# Patient Record
Sex: MALE | Race: WHITE | NOT HISPANIC OR LATINO | Employment: FULL TIME | URBAN - METROPOLITAN AREA
[De-identification: names, ages, dates, MRNs, and addresses within clinical notes are randomized per-mention and may not be internally consistent; named-entity substitution may affect disease eponyms.]

---

## 2021-01-28 ENCOUNTER — HISTORICAL (OUTPATIENT)
Dept: ADMINISTRATIVE | Facility: HOSPITAL | Age: 31
End: 2021-01-28

## 2021-01-28 LAB
ALBUMIN SERPL-MCNC: 4.8 G/DL (ref 4–5)
ALBUMIN/GLOB SERPL: 2 {RATIO} (ref 1.2–2.2)
ALP SERPL-CCNC: 57 IU/L (ref 39–117)
ALT SERPL-CCNC: 58 IU/L (ref 0–44)
AST SERPL-CCNC: 30 IU/L (ref 0–40)
BILIRUB SERPL-MCNC: 0.5 MG/DL (ref 0–1.2)
BUN SERPL-MCNC: 11 MG/DL (ref 6–20)
CALCIUM SERPL-MCNC: 10.1 MG/DL (ref 8.7–10.2)
CHLORIDE SERPL-SCNC: 104 MMOL/L (ref 96–106)
CO2 SERPL-SCNC: 23 MMOL/L (ref 20–29)
CREAT SERPL-MCNC: 1.24 MG/DL (ref 0.76–1.27)
CREAT/UREA NIT SERPL: 9 (ref 9–20)
GLOBULIN SER-MCNC: 2.4 G/DL (ref 1.5–4.5)
GLUCOSE SERPL-MCNC: 93 MG/DL (ref 65–99)
POTASSIUM SERPL-SCNC: 4.8 MMOL/L (ref 3.5–5.2)
PROT SERPL-MCNC: 7.2 G/DL (ref 6–8.5)
SODIUM SERPL-SCNC: 141 MMOL/L (ref 134–144)

## 2022-04-10 ENCOUNTER — HISTORICAL (OUTPATIENT)
Dept: ADMINISTRATIVE | Facility: HOSPITAL | Age: 32
End: 2022-04-10

## 2022-04-24 VITALS
SYSTOLIC BLOOD PRESSURE: 122 MMHG | DIASTOLIC BLOOD PRESSURE: 82 MMHG | WEIGHT: 261.44 LBS | BODY MASS INDEX: 38.72 KG/M2 | HEIGHT: 69 IN | OXYGEN SATURATION: 99 %

## 2022-08-16 ENCOUNTER — HISTORICAL (OUTPATIENT)
Dept: ADMINISTRATIVE | Facility: HOSPITAL | Age: 32
End: 2022-08-16

## 2023-05-31 ENCOUNTER — OFFICE VISIT (OUTPATIENT)
Dept: FAMILY MEDICINE | Facility: CLINIC | Age: 33
End: 2023-05-31
Payer: COMMERCIAL

## 2023-05-31 VITALS
WEIGHT: 274.19 LBS | BODY MASS INDEX: 39.25 KG/M2 | SYSTOLIC BLOOD PRESSURE: 104 MMHG | TEMPERATURE: 97 F | OXYGEN SATURATION: 96 % | HEIGHT: 70 IN | DIASTOLIC BLOOD PRESSURE: 70 MMHG | HEART RATE: 56 BPM

## 2023-05-31 DIAGNOSIS — K70.10 ALCOHOLIC HEPATITIS WITHOUT ASCITES: Primary | ICD-10-CM

## 2023-05-31 DIAGNOSIS — E78.2 MIXED HYPERLIPIDEMIA: ICD-10-CM

## 2023-05-31 DIAGNOSIS — F33.42 RECURRENT MAJOR DEPRESSIVE DISORDER, IN FULL REMISSION: ICD-10-CM

## 2023-05-31 DIAGNOSIS — G47.33 OBSTRUCTIVE SLEEP APNEA SYNDROME: ICD-10-CM

## 2023-05-31 DIAGNOSIS — R73.01 IMPAIRED FASTING GLUCOSE: ICD-10-CM

## 2023-05-31 DIAGNOSIS — K21.9 GASTROESOPHAGEAL REFLUX DISEASE WITHOUT ESOPHAGITIS: ICD-10-CM

## 2023-05-31 DIAGNOSIS — F41.1 GENERALIZED ANXIETY DISORDER: ICD-10-CM

## 2023-05-31 PROBLEM — F32.9 MAJOR DEPRESSIVE DISORDER: Status: ACTIVE | Noted: 2023-05-31

## 2023-05-31 PROBLEM — E66.9 OBESITY: Status: ACTIVE | Noted: 2023-05-31

## 2023-05-31 PROCEDURE — 3008F PR BODY MASS INDEX (BMI) DOCUMENTED: ICD-10-PCS | Mod: CPTII,,, | Performed by: FAMILY MEDICINE

## 2023-05-31 PROCEDURE — 3074F SYST BP LT 130 MM HG: CPT | Mod: CPTII,,, | Performed by: FAMILY MEDICINE

## 2023-05-31 PROCEDURE — 1159F MED LIST DOCD IN RCRD: CPT | Mod: CPTII,,, | Performed by: FAMILY MEDICINE

## 2023-05-31 PROCEDURE — 3074F PR MOST RECENT SYSTOLIC BLOOD PRESSURE < 130 MM HG: ICD-10-PCS | Mod: CPTII,,, | Performed by: FAMILY MEDICINE

## 2023-05-31 PROCEDURE — 99214 OFFICE O/P EST MOD 30 MIN: CPT | Mod: ,,, | Performed by: FAMILY MEDICINE

## 2023-05-31 PROCEDURE — 99214 PR OFFICE/OUTPT VISIT, EST, LEVL IV, 30-39 MIN: ICD-10-PCS | Mod: ,,, | Performed by: FAMILY MEDICINE

## 2023-05-31 PROCEDURE — 3078F DIAST BP <80 MM HG: CPT | Mod: CPTII,,, | Performed by: FAMILY MEDICINE

## 2023-05-31 PROCEDURE — 3078F PR MOST RECENT DIASTOLIC BLOOD PRESSURE < 80 MM HG: ICD-10-PCS | Mod: CPTII,,, | Performed by: FAMILY MEDICINE

## 2023-05-31 PROCEDURE — 3008F BODY MASS INDEX DOCD: CPT | Mod: CPTII,,, | Performed by: FAMILY MEDICINE

## 2023-05-31 PROCEDURE — 1159F PR MEDICATION LIST DOCUMENTED IN MEDICAL RECORD: ICD-10-PCS | Mod: CPTII,,, | Performed by: FAMILY MEDICINE

## 2023-05-31 RX ORDER — PROPRANOLOL HYDROCHLORIDE 120 MG/1
120 CAPSULE, EXTENDED RELEASE ORAL DAILY
Qty: 90 CAPSULE | Refills: 3 | Status: SHIPPED | OUTPATIENT
Start: 2023-05-31

## 2023-05-31 RX ORDER — PROPRANOLOL HYDROCHLORIDE 120 MG/1
120 CAPSULE, EXTENDED RELEASE ORAL DAILY
COMMUNITY
Start: 2023-03-03 | End: 2023-05-31 | Stop reason: SDUPTHER

## 2023-05-31 RX ORDER — VILAZODONE HYDROCHLORIDE 20 MG/1
1 TABLET ORAL DAILY
COMMUNITY
Start: 2023-03-27 | End: 2023-07-05

## 2023-05-31 RX ORDER — BUPROPION HYDROCHLORIDE 150 MG/1
150 TABLET ORAL DAILY
COMMUNITY
Start: 2023-03-03 | End: 2023-05-31 | Stop reason: SDUPTHER

## 2023-05-31 RX ORDER — BUPROPION HYDROCHLORIDE 150 MG/1
150 TABLET ORAL DAILY
Qty: 90 TABLET | Refills: 3 | Status: SHIPPED | OUTPATIENT
Start: 2023-05-31

## 2023-05-31 NOTE — ASSESSMENT & PLAN NOTE
A1c normal at this time with a normal fasting blood glucose.  Continue therapeutic lifestyle changes and monitor

## 2023-05-31 NOTE — PROGRESS NOTES
"SUBJECTIVE:  HPI    Chacho Galvan is a 33 y.o. male here for Follow-up (Lab results).     He has stopped using tobacco products completely as of February.  Does report recent increase social stressors but despite this is coping very well.  He denies any depressed mood or suicidal ideation.  He has been drinking a little bit more alcohol than normal.    Millies allergies, medications, history, and problem list were updated as appropriate.    ROS:  Pertinent ROS as above, otherwise negative    OBJECTIVE:  Vital signs  Visit Vitals  /70 (BP Location: Left arm, Patient Position: Sitting, BP Method: Large (Manual))   Pulse (!) 56   Temp 97.2 °F (36.2 °C) (Temporal)   Ht 5' 10.28" (1.785 m)   Wt 124.4 kg (274 lb 3.2 oz)   SpO2 96%   BMI 39.04 kg/m²          PHYSICAL EXAM:  General:  Awake, alert, no acute distress   Eyes:  Pupils equal, round, reactive to light.  Conjunctiva normal bilaterally.  Cardiovascular: Regular rate and rhythm.  No murmurs.  Respiratory: Clear to auscultation bilaterally, normal effort  Abdomen: Soft, nontender, nondistended, no hepatosplenomegaly or masses  Extremities:  No peripheral edema, no cyanosis  Skin: No rashes    May 23, 2023:  Glucose 100, BUN 12, creatinine 1.13, AST 51, , total cholesterol 155, triglycerides 199, HDL 33, LDL 88, hemoglobin A1c 5.4, hemoglobin 16, hematocrit 51, white blood cell count 9.1, platelet count 237      ASSESSMENT/PLAN:  1. Alcoholic hepatitis without ascites  Assessment & Plan:  Alcohol cessation discussed and counseled in detail    Orders:  -     CBC Auto Differential; Future; Expected date: 11/30/2023  -     Comprehensive Metabolic Panel; Future; Expected date: 11/30/2023    2. Mixed hyperlipidemia  Assessment & Plan:  Improve profile today.  Continue to monitor.    Orders:  -     Lipid Panel; Future; Expected date: 11/30/2023    3. Impaired fasting glucose  Overview:  August 11, 2022    Assessment & Plan:  A1c normal at this time with a " normal fasting blood glucose.  Continue therapeutic lifestyle changes and monitor    Orders:  -     Hemoglobin A1C; Future; Expected date: 11/30/2023    4. Gastroesophageal reflux disease without esophagitis    5. Obstructive sleep apnea syndrome  Overview:  Diagnosed January 2021    Assessment & Plan:  Compliant with CPAP.  Symptoms controlled.      6. Generalized anxiety disorder  Assessment & Plan:  Refill propranolol  mg daily    Orders:  -     propranoloL (INDERAL LA) 120 MG 24 hr capsule; Take 1 capsule (120 mg total) by mouth once daily.  Dispense: 90 capsule; Refill: 3    7. Recurrent major depressive disorder, in full remission  Assessment & Plan:  Continue Wellbutrin  mg daily and Viibryd 20 mg daily    Orders:  -     buPROPion (WELLBUTRIN XL) 150 MG TB24 tablet; Take 1 tablet (150 mg total) by mouth once daily.  Dispense: 90 tablet; Refill: 3    Follow Up:  Follow up in about 6 months (around 11/30/2023) for Fasting labs, Follow-up.

## 2023-07-05 DIAGNOSIS — F41.1 GENERALIZED ANXIETY DISORDER: Primary | ICD-10-CM

## 2023-07-05 RX ORDER — VILAZODONE HYDROCHLORIDE 20 MG/1
TABLET ORAL
Qty: 30 TABLET | Refills: 11 | Status: SHIPPED | OUTPATIENT
Start: 2023-07-05

## 2023-11-09 ENCOUNTER — OFFICE VISIT (OUTPATIENT)
Dept: FAMILY MEDICINE | Facility: CLINIC | Age: 33
End: 2023-11-09
Payer: COMMERCIAL

## 2023-11-09 VITALS
HEART RATE: 68 BPM | DIASTOLIC BLOOD PRESSURE: 76 MMHG | SYSTOLIC BLOOD PRESSURE: 120 MMHG | BODY MASS INDEX: 41.06 KG/M2 | OXYGEN SATURATION: 99 % | HEIGHT: 70 IN | WEIGHT: 286.81 LBS | TEMPERATURE: 97 F

## 2023-11-09 DIAGNOSIS — H66.93 ACUTE BILATERAL OTITIS MEDIA: Primary | ICD-10-CM

## 2023-11-09 PROCEDURE — 1160F PR REVIEW ALL MEDS BY PRESCRIBER/CLIN PHARMACIST DOCUMENTED: ICD-10-PCS | Mod: CPTII,,, | Performed by: FAMILY MEDICINE

## 2023-11-09 PROCEDURE — 1159F PR MEDICATION LIST DOCUMENTED IN MEDICAL RECORD: ICD-10-PCS | Mod: CPTII,,, | Performed by: FAMILY MEDICINE

## 2023-11-09 PROCEDURE — 3008F PR BODY MASS INDEX (BMI) DOCUMENTED: ICD-10-PCS | Mod: CPTII,,, | Performed by: FAMILY MEDICINE

## 2023-11-09 PROCEDURE — 99214 PR OFFICE/OUTPT VISIT, EST, LEVL IV, 30-39 MIN: ICD-10-PCS | Mod: ,,, | Performed by: FAMILY MEDICINE

## 2023-11-09 PROCEDURE — 3074F PR MOST RECENT SYSTOLIC BLOOD PRESSURE < 130 MM HG: ICD-10-PCS | Mod: CPTII,,, | Performed by: FAMILY MEDICINE

## 2023-11-09 PROCEDURE — 3078F PR MOST RECENT DIASTOLIC BLOOD PRESSURE < 80 MM HG: ICD-10-PCS | Mod: CPTII,,, | Performed by: FAMILY MEDICINE

## 2023-11-09 PROCEDURE — 1160F RVW MEDS BY RX/DR IN RCRD: CPT | Mod: CPTII,,, | Performed by: FAMILY MEDICINE

## 2023-11-09 PROCEDURE — 3008F BODY MASS INDEX DOCD: CPT | Mod: CPTII,,, | Performed by: FAMILY MEDICINE

## 2023-11-09 PROCEDURE — 1159F MED LIST DOCD IN RCRD: CPT | Mod: CPTII,,, | Performed by: FAMILY MEDICINE

## 2023-11-09 PROCEDURE — 3074F SYST BP LT 130 MM HG: CPT | Mod: CPTII,,, | Performed by: FAMILY MEDICINE

## 2023-11-09 PROCEDURE — 3078F DIAST BP <80 MM HG: CPT | Mod: CPTII,,, | Performed by: FAMILY MEDICINE

## 2023-11-09 PROCEDURE — 99214 OFFICE O/P EST MOD 30 MIN: CPT | Mod: ,,, | Performed by: FAMILY MEDICINE

## 2023-11-09 RX ORDER — CEFDINIR 300 MG/1
300 CAPSULE ORAL 2 TIMES DAILY
Qty: 20 CAPSULE | Refills: 0 | Status: SHIPPED | OUTPATIENT
Start: 2023-11-09 | End: 2023-11-19

## 2023-11-09 NOTE — PROGRESS NOTES
"SUBJECTIVE:  HPI    Chacho Galvan is a 33 y.o. male here for Ear Fullness and Dizziness.     Patient presents with a greater than 7 day history of bilateral aural fullness associated with some dizziness and bilateral diminished hearing.  He also reports some dizziness but is rather vague in his description of dizziness and it sounds more like orthostatic type dizziness.  He denies any true vertigo.      Millies allergies, medications, history, and problem list were updated as appropriate.    ROS:  Pertinent ROS as above, otherwise negative    OBJECTIVE:  Vital signs  Visit Vitals  /76 (BP Location: Left arm, Patient Position: Sitting)   Pulse 68   Temp 97.4 °F (36.3 °C) (Temporal)   Ht 5' 10.28" (1.785 m)   Wt 130.1 kg (286 lb 12.8 oz)   SpO2 99%   BMI 40.83 kg/m²          PHYSICAL EXAM:  General:  Awake, alert, no acute distress   Eyes:  Pupils equal, round, reactive to light.  Conjunctiva normal bilaterally.  Ears:  Bilateral external auditory canals normal.  Bilateral tympanic membranes thick and cloudy effusions.  Nares:  Bilateral turbinate erythema and edema with clear rhinorrhea.  Oropharynx:  Postnasal drainage present.  No erythema or exudate.  Neck:  No lymphadenopathy    ASSESSMENT/PLAN:  1. Acute bilateral otitis media  -     cefdinir (OMNICEF) 300 MG capsule; Take 1 capsule (300 mg total) by mouth 2 (two) times daily. for 10 days  Dispense: 20 capsule; Refill: 0          "

## 2023-12-06 LAB
ALBUMIN SERPL-MCNC: 4.6 G/DL (ref 4.1–5.1)
ALBUMIN/GLOB SERPL: 1.8 {RATIO} (ref 1.2–2.2)
ALP SERPL-CCNC: 71 IU/L (ref 44–121)
ALT SERPL-CCNC: 75 IU/L (ref 0–44)
AST SERPL-CCNC: 41 IU/L (ref 0–40)
BASOPHILS # BLD AUTO: 0.1 X10E3/UL (ref 0–0.2)
BASOPHILS NFR BLD AUTO: 1 %
BILIRUB SERPL-MCNC: 0.5 MG/DL (ref 0–1.2)
BUN SERPL-MCNC: 12 MG/DL (ref 6–20)
BUN/CREAT SERPL: 12 (ref 9–20)
CALCIUM SERPL-MCNC: 10 MG/DL (ref 8.7–10.2)
CHLORIDE SERPL-SCNC: 102 MMOL/L (ref 96–106)
CHOLEST SERPL-MCNC: 204 MG/DL (ref 100–199)
CO2 SERPL-SCNC: 24 MMOL/L (ref 20–29)
CREAT SERPL-MCNC: 1.03 MG/DL (ref 0.76–1.27)
EOSINOPHIL # BLD AUTO: 1 X10E3/UL (ref 0–0.4)
EOSINOPHIL NFR BLD AUTO: 10 %
ERYTHROCYTE [DISTWIDTH] IN BLOOD BY AUTOMATED COUNT: 12.1 % (ref 11.6–15.4)
EST. GFR  (NO RACE VARIABLE): 98 ML/MIN/1.73
GLOBULIN SER CALC-MCNC: 2.6 G/DL (ref 1.5–4.5)
GLUCOSE SERPL-MCNC: 108 MG/DL (ref 70–99)
HBA1C MFR BLD: 5.5 % (ref 4.8–5.6)
HCT VFR BLD AUTO: 51.8 % (ref 37.5–51)
HDLC SERPL-MCNC: 41 MG/DL
HGB BLD-MCNC: 17.6 G/DL (ref 13–17.7)
IMM GRANULOCYTES NFR BLD AUTO: 1 %
LDLC SERPL CALC-MCNC: 134 MG/DL (ref 0–99)
LYMPHOCYTES # BLD AUTO: 3.5 X10E3/UL (ref 0.7–3.1)
LYMPHOCYTES NFR BLD AUTO: 35 %
MCH RBC QN AUTO: 31.7 PG (ref 26.6–33)
MCHC RBC AUTO-ENTMCNC: 34 G/DL (ref 31.5–35.7)
MCV RBC AUTO: 93 FL (ref 79–97)
MONOCYTES # BLD AUTO: 0.8 X10E3/UL (ref 0.1–0.9)
MONOCYTES NFR BLD AUTO: 8 %
NEUTROPHILS # BLD AUTO: 4.7 X10E3/UL (ref 1.4–7)
NEUTROPHILS NFR BLD AUTO: 45 %
PLATELET # BLD AUTO: 214 X10E3/UL (ref 150–450)
POTASSIUM SERPL-SCNC: 4.1 MMOL/L (ref 3.5–5.2)
PROT SERPL-MCNC: 7.2 G/DL (ref 6–8.5)
RBC # BLD AUTO: 5.55 X10E6/UL (ref 4.14–5.8)
SODIUM SERPL-SCNC: 142 MMOL/L (ref 134–144)
TRIGL SERPL-MCNC: 159 MG/DL (ref 0–149)
VLDLC SERPL CALC-MCNC: 29 MG/DL (ref 5–40)
WBC # BLD AUTO: 10.2 X10E3/UL (ref 3.4–10.8)

## 2023-12-12 ENCOUNTER — OFFICE VISIT (OUTPATIENT)
Dept: FAMILY MEDICINE | Facility: CLINIC | Age: 33
End: 2023-12-12
Payer: COMMERCIAL

## 2023-12-12 VITALS
HEART RATE: 73 BPM | HEIGHT: 70 IN | OXYGEN SATURATION: 96 % | BODY MASS INDEX: 42.35 KG/M2 | SYSTOLIC BLOOD PRESSURE: 114 MMHG | DIASTOLIC BLOOD PRESSURE: 74 MMHG | WEIGHT: 295.81 LBS | TEMPERATURE: 98 F

## 2023-12-12 DIAGNOSIS — E78.2 MIXED HYPERLIPIDEMIA: Primary | ICD-10-CM

## 2023-12-12 DIAGNOSIS — K70.10 ALCOHOLIC HEPATITIS WITHOUT ASCITES: ICD-10-CM

## 2023-12-12 DIAGNOSIS — E66.01 CLASS 3 SEVERE OBESITY WITH BODY MASS INDEX (BMI) OF 40.0 TO 44.9 IN ADULT, UNSPECIFIED OBESITY TYPE, UNSPECIFIED WHETHER SERIOUS COMORBIDITY PRESENT: ICD-10-CM

## 2023-12-12 DIAGNOSIS — F33.42 RECURRENT MAJOR DEPRESSIVE DISORDER, IN FULL REMISSION: ICD-10-CM

## 2023-12-12 DIAGNOSIS — R73.01 IMPAIRED FASTING GLUCOSE: ICD-10-CM

## 2023-12-12 DIAGNOSIS — F41.1 GENERALIZED ANXIETY DISORDER: ICD-10-CM

## 2023-12-12 PROCEDURE — 1159F MED LIST DOCD IN RCRD: CPT | Mod: CPTII,,, | Performed by: FAMILY MEDICINE

## 2023-12-12 PROCEDURE — 1160F RVW MEDS BY RX/DR IN RCRD: CPT | Mod: CPTII,,, | Performed by: FAMILY MEDICINE

## 2023-12-12 PROCEDURE — 1159F PR MEDICATION LIST DOCUMENTED IN MEDICAL RECORD: ICD-10-PCS | Mod: CPTII,,, | Performed by: FAMILY MEDICINE

## 2023-12-12 PROCEDURE — 99214 PR OFFICE/OUTPT VISIT, EST, LEVL IV, 30-39 MIN: ICD-10-PCS | Mod: ,,, | Performed by: FAMILY MEDICINE

## 2023-12-12 PROCEDURE — 3074F SYST BP LT 130 MM HG: CPT | Mod: CPTII,,, | Performed by: FAMILY MEDICINE

## 2023-12-12 PROCEDURE — 3008F PR BODY MASS INDEX (BMI) DOCUMENTED: ICD-10-PCS | Mod: CPTII,,, | Performed by: FAMILY MEDICINE

## 2023-12-12 PROCEDURE — 3008F BODY MASS INDEX DOCD: CPT | Mod: CPTII,,, | Performed by: FAMILY MEDICINE

## 2023-12-12 PROCEDURE — 1160F PR REVIEW ALL MEDS BY PRESCRIBER/CLIN PHARMACIST DOCUMENTED: ICD-10-PCS | Mod: CPTII,,, | Performed by: FAMILY MEDICINE

## 2023-12-12 PROCEDURE — 3074F PR MOST RECENT SYSTOLIC BLOOD PRESSURE < 130 MM HG: ICD-10-PCS | Mod: CPTII,,, | Performed by: FAMILY MEDICINE

## 2023-12-12 PROCEDURE — 99214 OFFICE O/P EST MOD 30 MIN: CPT | Mod: ,,, | Performed by: FAMILY MEDICINE

## 2023-12-12 PROCEDURE — 3078F DIAST BP <80 MM HG: CPT | Mod: CPTII,,, | Performed by: FAMILY MEDICINE

## 2023-12-12 PROCEDURE — 3044F HG A1C LEVEL LT 7.0%: CPT | Mod: CPTII,,, | Performed by: FAMILY MEDICINE

## 2023-12-12 PROCEDURE — 3044F PR MOST RECENT HEMOGLOBIN A1C LEVEL <7.0%: ICD-10-PCS | Mod: CPTII,,, | Performed by: FAMILY MEDICINE

## 2023-12-12 PROCEDURE — 3078F PR MOST RECENT DIASTOLIC BLOOD PRESSURE < 80 MM HG: ICD-10-PCS | Mod: CPTII,,, | Performed by: FAMILY MEDICINE

## 2023-12-12 NOTE — PROGRESS NOTES
"SUBJECTIVE:  HPI    Chacho Galvan is a 33 y.o. male here for Follow-up (Lab results) on chronic health conditions, outlined below.    He is in the process of starting a new diet and losing some weight.  He has significantly cut back on his alcohol consumption.  He reports that he only drinks about 2-3 times per month.    His has been very stable.  He reports that he is feeling well.  He has not having any depression or anxiety.  He reports that he would like to get off of his Viibryd and Wellbutrin at some point in the future.      Millies allergies, medications, history, and problem list were updated as appropriate.    ROS:  Pertinent ROS as above, otherwise negative    OBJECTIVE:  Vital signs  Visit Vitals  /74 (BP Location: Left arm, Patient Position: Sitting, BP Method: Large (Manual))   Pulse 73   Temp 97.5 °F (36.4 °C) (Temporal)   Ht 5' 10.28" (1.785 m)   Wt 134.2 kg (295 lb 12.8 oz)   SpO2 96%   BMI 42.11 kg/m²          PHYSICAL EXAM:  General:  Awake, alert, no acute distress, obese   Eyes:  Pupils equal, round, reactive to light.  Conjunctiva normal bilaterally.  Ears:  Bilateral external auditory canals normal.  Bilateral tympanic membranes normal.  Skin: No rashes    Chemistry:  Lab Results   Component Value Date     12/05/2023    K 4.1 12/05/2023    BUN 12 12/05/2023    CREATININE 1.0 (A) 12/05/2023    EGFRNORACEVR 98 12/05/2023    GLUCOSE 93 01/28/2021    CALCIUM 10.0 12/05/2023    ALKPHOS 57 01/28/2021    AST 41 (H) 12/04/2023    ALT 75 (A) 12/05/2023        Lab Results   Component Value Date    HGBA1C 5.5 12/05/2023        Hematology:  Lab Results   Component Value Date    WBC 10.2 12/05/2023    HGB 17.6 (A) 12/05/2023    MCV 93.0 12/05/2023     12/04/2023       Lipid Panel:  Lab Results   Component Value Date    CHOL 204 (A) 12/05/2023    HDL 41 12/05/2023    TRIG 159 12/05/2023        ASSESSMENT/PLAN:  1. Mixed hyperlipidemia  Overview:  Lab Results   Component Value Date    " "CHOL 204 (A) 12/05/2023    CHOL 204 (H) 12/04/2023     Lab Results   Component Value Date    HDL 41 12/05/2023    HDL 41 12/04/2023     Lab Results   Component Value Date    LDLCALC 134 12/05/2023    LDLCALC 134 (H) 12/04/2023     Lab Results   Component Value Date    TRIG 159 12/05/2023    TRIG 159 (H) 12/04/2023       No results found for: "CHOLHDL"      Assessment & Plan:  Dietary modifications     If still elevated on return to clinic in 6 months, start statin therapy    Orders:  -     Lipid Panel; Future; Expected date: 06/12/2024    2. Impaired fasting glucose  Overview:  August 11, 2022    Lab Results   Component Value Date    HGBA1C 5.5 12/05/2023         Assessment & Plan:  Therapeutic lifestyle changes with low-carbohydrate diet and regular exercise      3. Generalized anxiety disorder  Assessment & Plan:  Controlled with Viibryd 20 mg daily and augmentation with Wellbutrin  mg daily       4. Recurrent major depressive disorder, in full remission  Assessment & Plan:  Controlled with Viibryd 20 mg daily and augmentation with Wellbutrin  mg daily       5. Class 3 severe obesity with body mass index (BMI) of 40.0 to 44.9 in adult, unspecified obesity type, unspecified whether serious comorbidity present  Assessment & Plan:  Therapeutic lifestyle changes with low-carbohydrate, low-fat diet and regular exercise      6. Alcoholic hepatitis without ascites  Overview:  Lab Results   Component Value Date    ALT 75 (A) 12/05/2023    AST 41 (H) 12/04/2023    ALKPHOS 57 01/28/2021    BILITOT 0.5 12/04/2023         Assessment & Plan:  Alcohol cessation discussed    Orders:  -     Comprehensive Metabolic Panel; Future; Expected date: 06/12/2024  -     CBC Auto Differential; Future; Expected date: 06/12/2024        Follow Up:  Follow up in about 6 months (around 6/12/2024) for Fasting labs, Follow-up.          "

## 2024-06-13 ENCOUNTER — LAB VISIT (OUTPATIENT)
Dept: LAB | Facility: CLINIC | Age: 34
End: 2024-06-13
Payer: COMMERCIAL

## 2024-06-13 DIAGNOSIS — E78.2 MIXED HYPERLIPIDEMIA: ICD-10-CM

## 2024-06-13 DIAGNOSIS — K70.10 ALCOHOLIC HEPATITIS WITHOUT ASCITES: Primary | ICD-10-CM

## 2024-06-13 DIAGNOSIS — R73.01 IMPAIRED FASTING GLUCOSE: ICD-10-CM

## 2024-06-13 LAB
ALBUMIN SERPL-MCNC: 4.5 G/DL (ref 4.1–5.1)
ALBUMIN/GLOB SERPL: 1.5 {RATIO}
ALP SERPL-CCNC: 68 IU/L (ref 44–121)
ALT SERPL-CCNC: 48 IU/L (ref 0–44)
AST SERPL-CCNC: 32 IU/L (ref 0–40)
BASOPHILS # BLD AUTO: 0.1 X10E3/UL (ref 0–0.2)
BASOPHILS NFR BLD AUTO: 1 %
BILIRUB SERPL-MCNC: 0.6 MG/DL (ref 0–1.2)
BUN SERPL-MCNC: 10 MG/DL (ref 6–20)
BUN/CREAT SERPL: 9 (ref 9–20)
CALCIUM SERPL-MCNC: 9.6 MG/DL (ref 8.7–10.2)
CHLORIDE SERPL-SCNC: 105 MMOL/L (ref 96–106)
CHOLEST SERPL-MCNC: 183 MG/DL (ref 100–199)
CO2 SERPL-SCNC: 24 MMOL/L (ref 20–29)
CREAT SERPL-MCNC: 1.06 MG/DL (ref 0.76–1.27)
EOSINOPHIL # BLD AUTO: 0.8 X10E3/UL (ref 0–0.4)
EOSINOPHIL NFR BLD AUTO: 10 %
ERYTHROCYTE [DISTWIDTH] IN BLOOD BY AUTOMATED COUNT: 12.5 % (ref 11.6–15.4)
EST. GFR  (NO RACE VARIABLE): 94 ML/MIN/1.73
GLOBULIN SER CALC-MCNC: 3 G/DL (ref 1.5–4.5)
GLUCOSE SERPL-MCNC: 100 MG/DL (ref 70–99)
HCT VFR BLD AUTO: 50 % (ref 37.5–51)
HDLC SERPL-MCNC: 38 MG/DL
HGB BLD-MCNC: 17.2 G/DL (ref 13–17.7)
IMM GRANULOCYTES NFR BLD AUTO: 0 %
LDLC SERPL CALC-MCNC: 116 MG/DL (ref 0–99)
LYMPHOCYTES # BLD AUTO: 2.7 X10E3/UL (ref 0.7–3.1)
LYMPHOCYTES NFR BLD AUTO: 30 %
MCH RBC QN AUTO: 31.8 PG (ref 26.6–33)
MCHC RBC AUTO-ENTMCNC: 34.4 G/DL (ref 31.5–35.7)
MCV RBC AUTO: 92 FL (ref 79–97)
MONOCYTES # BLD AUTO: 0.6 X10E3/UL (ref 0.1–0.9)
MONOCYTES NFR BLD AUTO: 7 %
NEUTROPHILS # BLD AUTO: 4.6 X10E3/UL (ref 1.4–7)
NEUTROPHILS NFR BLD AUTO: 52 %
PLATELET # BLD AUTO: 229 X10E3/UL (ref 150–450)
POTASSIUM SERPL-SCNC: 4.1 MMOL/L (ref 3.5–5.2)
PROT SERPL-MCNC: 7.5 G/DL (ref 6–8.5)
RBC # BLD AUTO: 5.41 X10E6/UL (ref 4.14–5.8)
SODIUM SERPL-SCNC: 143 MMOL/L (ref 134–144)
TRIGL SERPL-MCNC: 165 MG/DL (ref 0–149)
VLDLC SERPL CALC-MCNC: 29 MG/DL (ref 5–40)
WBC # BLD AUTO: 8.8 X10E3/UL (ref 3.4–10.8)

## 2024-06-20 ENCOUNTER — OFFICE VISIT (OUTPATIENT)
Dept: FAMILY MEDICINE | Facility: CLINIC | Age: 34
End: 2024-06-20
Payer: COMMERCIAL

## 2024-06-20 VITALS
HEART RATE: 81 BPM | HEIGHT: 70 IN | TEMPERATURE: 97 F | BODY MASS INDEX: 39.22 KG/M2 | OXYGEN SATURATION: 97 % | DIASTOLIC BLOOD PRESSURE: 80 MMHG | SYSTOLIC BLOOD PRESSURE: 112 MMHG | WEIGHT: 274 LBS

## 2024-06-20 DIAGNOSIS — G47.33 OBSTRUCTIVE SLEEP APNEA SYNDROME: ICD-10-CM

## 2024-06-20 DIAGNOSIS — E78.2 MIXED HYPERLIPIDEMIA: Primary | ICD-10-CM

## 2024-06-20 DIAGNOSIS — F33.42 RECURRENT MAJOR DEPRESSIVE DISORDER, IN FULL REMISSION: ICD-10-CM

## 2024-06-20 DIAGNOSIS — K70.10 ALCOHOLIC HEPATITIS WITHOUT ASCITES: ICD-10-CM

## 2024-06-20 DIAGNOSIS — E66.01 CLASS 2 SEVERE OBESITY DUE TO EXCESS CALORIES WITH SERIOUS COMORBIDITY AND BODY MASS INDEX (BMI) OF 39.0 TO 39.9 IN ADULT: ICD-10-CM

## 2024-06-20 DIAGNOSIS — R73.01 IMPAIRED FASTING GLUCOSE: ICD-10-CM

## 2024-06-20 DIAGNOSIS — F41.1 GENERALIZED ANXIETY DISORDER: ICD-10-CM

## 2024-06-20 PROBLEM — E66.812 CLASS 2 SEVERE OBESITY DUE TO EXCESS CALORIES WITH SERIOUS COMORBIDITY AND BODY MASS INDEX (BMI) OF 39.0 TO 39.9 IN ADULT: Status: ACTIVE | Noted: 2023-05-31

## 2024-06-20 PROCEDURE — 99214 OFFICE O/P EST MOD 30 MIN: CPT | Mod: ,,, | Performed by: FAMILY MEDICINE

## 2024-06-20 PROCEDURE — 3008F BODY MASS INDEX DOCD: CPT | Mod: CPTII,,, | Performed by: FAMILY MEDICINE

## 2024-06-20 PROCEDURE — 1159F MED LIST DOCD IN RCRD: CPT | Mod: CPTII,,, | Performed by: FAMILY MEDICINE

## 2024-06-20 PROCEDURE — 3079F DIAST BP 80-89 MM HG: CPT | Mod: CPTII,,, | Performed by: FAMILY MEDICINE

## 2024-06-20 PROCEDURE — 3074F SYST BP LT 130 MM HG: CPT | Mod: CPTII,,, | Performed by: FAMILY MEDICINE

## 2024-06-20 RX ORDER — PROPRANOLOL HYDROCHLORIDE 120 MG/1
120 CAPSULE, EXTENDED RELEASE ORAL DAILY
Qty: 90 CAPSULE | Refills: 3 | Status: SHIPPED | OUTPATIENT
Start: 2024-06-20 | End: 2025-06-20

## 2024-06-20 RX ORDER — VILAZODONE HYDROCHLORIDE 20 MG/1
1 TABLET ORAL DAILY
Qty: 90 TABLET | Refills: 3 | Status: SHIPPED | OUTPATIENT
Start: 2024-06-20 | End: 2025-06-20

## 2024-06-20 RX ORDER — BUPROPION HYDROCHLORIDE 150 MG/1
150 TABLET ORAL DAILY
Qty: 90 TABLET | Refills: 3 | Status: SHIPPED | OUTPATIENT
Start: 2024-06-20 | End: 2025-06-20

## 2024-06-20 NOTE — ASSESSMENT & PLAN NOTE
His numbers appear improved with therapeutic lifestyle changes.  I counseled to continue this and we will reassess again in 6 months.

## 2024-06-20 NOTE — PROGRESS NOTES
"SUBJECTIVE:  HPI    Chacho Galvan is a 34 y.o. male here for Follow-up (labs) on labs and multiple medical problems.  See assessment and plan for discussion.    He is doing very well.  He followed a ketogenic diet for greater than 3 months and lost greater than 30 lb.  He recently has been noncompliant with his diet and gained about 10 lb.  However, 2 weeks ago, he restarted his ketogenic diet.  He is feeling much better.  He is also decreased his alcohol consumption to 2-3 beers on the weekends only.    His depression has been very well-controlled.    Millies allergies, medications, history, and problem list were updated as appropriate.    ROS:  Pertinent ROS as above, otherwise negative    OBJECTIVE:  Vital signs  Visit Vitals  /80 (BP Location: Right arm, Patient Position: Sitting)   Pulse 81   Temp 96.7 °F (35.9 °C) (Temporal)   Ht 5' 10.28" (1.785 m)   Wt 124.3 kg (274 lb)   SpO2 97%   BMI 39.01 kg/m²          PHYSICAL EXAM:  General: Awake, alert, no acute distress, obese  Neck:  No bruits, no masses  Cardiovascular:  Regular rhythm.  Normal rate.  No murmurs.  Respiratory: Clear to auscultation bilaterally, normal effort  Abdomen: Soft, nontender, nondistended, no hepatosplenomegaly   Extremities:  No cyanosis, no clubbing, no edema  Skin:  No rashes or appreciable lesions   Neuro:  Cranial nerves 2-12 are intact with usual testing.  Gait is normal.  Moves all 4 extremities equally and symmetrically.  Psychiatric:  Normal mood and affect.    Chemistry:  Lab Results   Component Value Date     06/13/2024    K 4.1 06/13/2024    BUN 10 06/13/2024    CREATININE 1.06 06/13/2024    EGFRNORACEVR 94 06/13/2024    GLUCOSE 93 01/28/2021    CALCIUM 9.6 06/13/2024    ALKPHOS 57 01/28/2021    AST 32 06/13/2024    ALT 48 (H) 06/13/2024        Lab Results   Component Value Date    HGBA1C 5.5 12/05/2023        Hematology:  Lab Results   Component Value Date    WBC 8.8 06/13/2024    HGB 17.2 06/13/2024    MCV " "92 06/13/2024     06/13/2024       Lipid Panel:  Lab Results   Component Value Date    CHOL 183 06/13/2024    HDL 38 (L) 06/13/2024    TRIG 165 (H) 06/13/2024        ASSESSMENT/PLAN:  1. Mixed hyperlipidemia  Overview:  Lab Results   Component Value Date    CHOL 183 06/13/2024    CHOL 204 (A) 12/05/2023    CHOL 204 (H) 12/04/2023     Lab Results   Component Value Date    HDL 38 (L) 06/13/2024    HDL 41 12/05/2023    HDL 41 12/04/2023     Lab Results   Component Value Date    LDLCALC 116 (H) 06/13/2024    LDLCALC 134 12/05/2023    LDLCALC 134 (H) 12/04/2023     Lab Results   Component Value Date    TRIG 165 (H) 06/13/2024    TRIG 159 12/05/2023    TRIG 159 (H) 12/04/2023       No results found for: "CHOLHDL"      Assessment & Plan:  His numbers appear improved with therapeutic lifestyle changes.  I counseled to continue this and we will reassess again in 6 months.    Orders:  -     Lipid Panel; Future; Expected date: 12/20/2024    2. Impaired fasting glucose  Overview:  August 11, 2022    Lab Results   Component Value Date    HGBA1C 5.5 12/05/2023         Assessment & Plan:  Therapeutic lifestyle changes with low-carbohydrate diet and regular exercise    Orders:  -     Hemoglobin A1C; Future; Expected date: 12/20/2024    3. Alcoholic hepatitis without ascites  Overview:  Lab Results   Component Value Date    ALT 48 (H) 06/13/2024    AST 32 06/13/2024    ALKPHOS 57 01/28/2021    BILITOT 0.6 06/13/2024     Ultrasound abdomen August 16, 2022:  Mild fatty infiltration of the liver    Assessment & Plan:  Much improved and now moderate alcohol consumption, counseled    Orders:  -     Comprehensive Metabolic Panel; Future; Expected date: 12/20/2024    4. Recurrent major depressive disorder, in full remission  Assessment & Plan:  Controlled with Viibryd 20 mg daily and augmentation with Wellbutrin  mg daily     Orders:  -     buPROPion (WELLBUTRIN XL) 150 MG TB24 tablet; Take 1 tablet (150 mg total) by mouth " once daily.  Dispense: 90 tablet; Refill: 3  -     vilazodone (VIIBRYD) 20 mg Tab; Take 1 tablet (20 mg total) by mouth once daily.  Dispense: 90 tablet; Refill: 3    5. Class 2 severe obesity due to excess calories with serious comorbidity and body mass index (BMI) of 39.0 to 39.9 in adult  Assessment & Plan:  Therapeutic lifestyle changes with low-carbohydrate, low-fat diet and regular exercise      6. Generalized anxiety disorder  Assessment & Plan:  Controlled with Viibryd 20 mg daily and augmentation with Wellbutrin  mg daily and propranolol    Orders:  -     vilazodone (VIIBRYD) 20 mg Tab; Take 1 tablet (20 mg total) by mouth once daily.  Dispense: 90 tablet; Refill: 3  -     propranoloL (INDERAL LA) 120 MG 24 hr capsule; Take 1 capsule (120 mg total) by mouth once daily.  Dispense: 90 capsule; Refill: 3    7. Obstructive sleep apnea syndrome  Overview:  Diagnosed January 2021    Assessment & Plan:  Compliant with CPAP.  Symptoms controlled.          Follow Up:  Follow up in about 6 months (around 12/20/2024) for chronic health conditions, Fasting labs.

## 2025-02-20 ENCOUNTER — OFFICE VISIT (OUTPATIENT)
Dept: FAMILY MEDICINE | Facility: CLINIC | Age: 35
End: 2025-02-20
Payer: COMMERCIAL

## 2025-02-20 VITALS
OXYGEN SATURATION: 97 % | DIASTOLIC BLOOD PRESSURE: 82 MMHG | TEMPERATURE: 97 F | SYSTOLIC BLOOD PRESSURE: 120 MMHG | HEIGHT: 70 IN | WEIGHT: 302.19 LBS | HEART RATE: 113 BPM | BODY MASS INDEX: 43.26 KG/M2

## 2025-02-20 DIAGNOSIS — K70.10 ALCOHOLIC HEPATITIS WITHOUT ASCITES: ICD-10-CM

## 2025-02-20 DIAGNOSIS — E66.01 CLASS 3 SEVERE OBESITY DUE TO EXCESS CALORIES WITH SERIOUS COMORBIDITY AND BODY MASS INDEX (BMI) OF 40.0 TO 44.9 IN ADULT: ICD-10-CM

## 2025-02-20 DIAGNOSIS — H66.91 ACUTE RIGHT OTITIS MEDIA: ICD-10-CM

## 2025-02-20 DIAGNOSIS — G47.33 OBSTRUCTIVE SLEEP APNEA SYNDROME: ICD-10-CM

## 2025-02-20 DIAGNOSIS — E66.813 CLASS 3 SEVERE OBESITY DUE TO EXCESS CALORIES WITH SERIOUS COMORBIDITY AND BODY MASS INDEX (BMI) OF 40.0 TO 44.9 IN ADULT: ICD-10-CM

## 2025-02-20 DIAGNOSIS — H60.392 OTHER INFECTIVE ACUTE OTITIS EXTERNA OF LEFT EAR: ICD-10-CM

## 2025-02-20 DIAGNOSIS — R73.01 IMPAIRED FASTING GLUCOSE: Primary | ICD-10-CM

## 2025-02-20 DIAGNOSIS — F33.42 RECURRENT MAJOR DEPRESSIVE DISORDER, IN FULL REMISSION: ICD-10-CM

## 2025-02-20 DIAGNOSIS — F41.1 GENERALIZED ANXIETY DISORDER: ICD-10-CM

## 2025-02-20 DIAGNOSIS — E78.2 MIXED HYPERLIPIDEMIA: ICD-10-CM

## 2025-02-20 PROCEDURE — 1159F MED LIST DOCD IN RCRD: CPT | Mod: CPTII,,, | Performed by: FAMILY MEDICINE

## 2025-02-20 PROCEDURE — 1160F RVW MEDS BY RX/DR IN RCRD: CPT | Mod: CPTII,,, | Performed by: FAMILY MEDICINE

## 2025-02-20 RX ORDER — OFLOXACIN 3 MG/ML
5 SOLUTION AURICULAR (OTIC) 2 TIMES DAILY
Qty: 5 ML | Refills: 0 | Status: SHIPPED | OUTPATIENT
Start: 2025-02-20 | End: 2025-02-27

## 2025-02-20 RX ORDER — CEFDINIR 300 MG/1
300 CAPSULE ORAL 2 TIMES DAILY
Qty: 20 CAPSULE | Refills: 0 | Status: SHIPPED | OUTPATIENT
Start: 2025-02-20 | End: 2025-03-02

## 2025-02-20 NOTE — PROGRESS NOTES
"SUBJECTIVE:  HPI    Chacho Galvan is a 34 y.o. male here for 6 mo follow -review labs.   History of Present Illness    CHIEF COMPLAINT:  Patient presents today for follow up of ear infection    LEFT EAR INFECTION:  He reports left ear discomfort for about two weeks with daily crusty discharge requiring morning cleaning. Last week, he experienced hot and burning sensation extending down from the ear. He reports occasional dizziness. He has a non-healing sore on the top of his left ear due to frequent picking.    ENT SYMPTOMS:  He reports intermittent nasal congestion that varies daily with nasal discharge and difficulty breathing through nose. He notes jaw soreness which he attributes to possible clenching.    SLEEP:  He experiences vivid dreams involving realistic scenarios about school and past friends, which he attributes to Vibryd medication. He has sleep apnea but is non-compliant with CPAP therapy.    MENTAL HEALTH:  He experienced depression following his uncle's death from cancer before Christmas due to their close relationship. During this period, he increased alcohol consumption as a coping mechanism and had difficulty concentrating at work. He reports the grieving process lasted couple of weeks and has now resolved.    WEIGHT MANAGEMENT:  He reports gaining 30 lbs due to stress eating and decreased physical activity.            Millies allergies, medications, history, and problem list were updated as appropriate.    ROS:  Pertinent ROS as above, otherwise negative    OBJECTIVE:  Vital signs  Visit Vitals  /82 (BP Location: Left arm, Patient Position: Sitting)   Pulse (!) 113   Temp 96.8 °F (36 °C) (Temporal)   Ht 5' 10.08" (1.78 m)   Wt (!) 137.1 kg (302 lb 3.2 oz)   SpO2 97%   BMI 43.26 kg/m²          PHYSICAL EXAM:  General: Awake, alert, no acute distress, obese, weight up greater than 30 lb from last visit  ENT: Right external auditory canal normal.  Right tympanic membrane erythematous and " retracted.  Left external auditory  to palpation and erythematous.  Left tympanic membrane normal.  Psychiatric: Mood and affect are normal.  Judgment insight are intact.  Not anxious.    Chemistry:  Lab Results   Component Value Date     02/14/2025    K 4.3 02/14/2025    BUN 12 02/14/2025    CREATININE 1.02 02/14/2025    EGFRNORACEVR 98 02/14/2025    GLUCOSE 93 01/28/2021    CALCIUM 9.6 02/14/2025    ALKPHOS 57 01/28/2021    AST 43 (H) 02/14/2025    ALT 77 (H) 02/14/2025        Lab Results   Component Value Date    HGBA1C 5.4 02/14/2025        Hematology:  Lab Results   Component Value Date    WBC 8.8 06/13/2024    HGB 17.2 06/13/2024    MCV 92 06/13/2024     06/13/2024       Lipid Panel:  Lab Results   Component Value Date    CHOL 191 02/14/2025    HDL 39 (L) 02/14/2025    TRIG 210 (H) 02/14/2025        ASSESSMENT/PLAN:  1. Impaired fasting glucose  Overview:  August 11, 2022    Lab Results   Component Value Date    HGBA1C 5.4 02/14/2025         Assessment & Plan:  Therapeutic lifestyle changes with low-carbohydrate diet and regular exercise    In light of comorbidities with prediabetes and mixed hyperlipidemia, I have asked the patient to look into coverage with his insurance of Tirzepatide therapy.  He will let us know.    Orders:  -     CBC Auto Differential; Future; Expected date: 06/20/2025  -     Hemoglobin A1C; Future; Expected date: 06/20/2025    2. Mixed hyperlipidemia  Overview:  Lab Results   Component Value Date    CHOL 191 02/14/2025    HDL 39 (L) 02/14/2025    TRIG 210 (H) 02/14/2025            Assessment & Plan:  Lipid profile dramatically improved with therapeutic lifestyle changes in the past    Orders:  -     Lipid Panel; Future; Expected date: 06/20/2025    3. Class 3 severe obesity due to excess calories with serious comorbidity and body mass index (BMI) of 40.0 to 44.9 in adult  Assessment & Plan:  Therapeutic lifestyle changes with low-carbohydrate, low-fat diet and  regular exercise    In light of comorbidities with prediabetes and mixed hyperlipidemia, I have asked the patient to look into coverage with his insurance of Tirzepatide therapy.  He will let us know.      4. Alcoholic hepatitis without ascites  Overview:  Lab Results   Component Value Date    ALT 77 (H) 02/14/2025    AST 43 (H) 02/14/2025    ALKPHOS 57 01/28/2021    BILITOT 0.6 02/14/2025     Ultrasound abdomen August 16, 2022:  Mild fatty infiltration of the liver    Assessment & Plan:  Previously improve with alcohol moderation      Orders:  -     Comprehensive Metabolic Panel; Future; Expected date: 06/20/2025  -     CBC Auto Differential; Future; Expected date: 06/20/2025    5. Obstructive sleep apnea syndrome  Overview:  Diagnosed January 2021    Assessment & Plan:  Compliance with CPAP reinforced      6. Recurrent major depressive disorder, in full remission  Assessment & Plan:  Controlled with Viibryd 20 mg daily and augmentation with Wellbutrin  mg daily       7. Generalized anxiety disorder  Assessment & Plan:  Controlled with Viibryd 20 mg daily and augmentation with Wellbutrin  mg daily and propranolol      8. Other infective acute otitis externa of left ear  -     ofloxacin (FLOXIN) 0.3 % otic solution; Place 5 drops into the left ear 2 (two) times daily. for 7 days  Dispense: 5 mL; Refill: 0    9. Acute right otitis media  -     cefdinir (OMNICEF) 300 MG capsule; Take 1 capsule (300 mg total) by mouth 2 (two) times daily. for 10 days  Dispense: 20 capsule; Refill: 0            Follow Up:  Follow up in about 4 months (around 6/20/2025) for chronic health conditions, Fasting labs.      This note was generated with the assistance of ambient listening technology. Verbal consent was obtained by the patient and accompanying visitor(s) for the recording of patient appointment to facilitate this note. I attest to having reviewed and edited the generated note for accuracy, though some syntax or  spelling errors may persist. Please contact the author of this note for any clarification.

## 2025-02-20 NOTE — ASSESSMENT & PLAN NOTE
Therapeutic lifestyle changes with low-carbohydrate diet and regular exercise    In light of comorbidities with prediabetes and mixed hyperlipidemia, I have asked the patient to look into coverage with his insurance of Tirzepatide therapy.  He will let us know.

## 2025-02-20 NOTE — ASSESSMENT & PLAN NOTE
Therapeutic lifestyle changes with low-carbohydrate, low-fat diet and regular exercise    In light of comorbidities with prediabetes and mixed hyperlipidemia, I have asked the patient to look into coverage with his insurance of Tirzepatide therapy.  He will let us know.

## 2025-02-25 ENCOUNTER — TELEPHONE (OUTPATIENT)
Dept: FAMILY MEDICINE | Facility: CLINIC | Age: 35
End: 2025-02-25
Payer: COMMERCIAL

## 2025-03-12 DIAGNOSIS — E66.01 CLASS 3 SEVERE OBESITY DUE TO EXCESS CALORIES WITH SERIOUS COMORBIDITY AND BODY MASS INDEX (BMI) OF 40.0 TO 44.9 IN ADULT: Primary | ICD-10-CM

## 2025-03-12 DIAGNOSIS — E66.813 CLASS 3 SEVERE OBESITY DUE TO EXCESS CALORIES WITH SERIOUS COMORBIDITY AND BODY MASS INDEX (BMI) OF 40.0 TO 44.9 IN ADULT: Primary | ICD-10-CM

## 2025-03-12 RX ORDER — TIRZEPATIDE 5 MG/.5ML
5 INJECTION, SOLUTION SUBCUTANEOUS
Qty: 2 ML | Refills: 5 | Status: SHIPPED | OUTPATIENT
Start: 2025-03-12

## 2025-03-18 ENCOUNTER — TELEPHONE (OUTPATIENT)
Dept: FAMILY MEDICINE | Facility: CLINIC | Age: 35
End: 2025-03-18
Payer: COMMERCIAL

## 2025-05-14 ENCOUNTER — TELEPHONE (OUTPATIENT)
Dept: FAMILY MEDICINE | Facility: CLINIC | Age: 35
End: 2025-05-14
Payer: COMMERCIAL

## 2025-05-14 DIAGNOSIS — G47.33 OBSTRUCTIVE SLEEP APNEA SYNDROME: Primary | ICD-10-CM

## 2025-05-14 RX ORDER — TIRZEPATIDE 7.5 MG/.5ML
7.5 INJECTION, SOLUTION SUBCUTANEOUS
Qty: 2 ML | Refills: 5 | Status: SHIPPED | OUTPATIENT
Start: 2025-05-14

## 2025-06-23 ENCOUNTER — OFFICE VISIT (OUTPATIENT)
Dept: FAMILY MEDICINE | Facility: CLINIC | Age: 35
End: 2025-06-23
Payer: COMMERCIAL

## 2025-06-23 VITALS
DIASTOLIC BLOOD PRESSURE: 72 MMHG | WEIGHT: 268.63 LBS | BODY MASS INDEX: 38.46 KG/M2 | SYSTOLIC BLOOD PRESSURE: 120 MMHG | TEMPERATURE: 97 F | HEIGHT: 70 IN | OXYGEN SATURATION: 98 % | HEART RATE: 78 BPM

## 2025-06-23 DIAGNOSIS — R73.01 IMPAIRED FASTING GLUCOSE: ICD-10-CM

## 2025-06-23 DIAGNOSIS — E66.01 CLASS 2 SEVERE OBESITY DUE TO EXCESS CALORIES WITH SERIOUS COMORBIDITY AND BODY MASS INDEX (BMI) OF 38.0 TO 38.9 IN ADULT: ICD-10-CM

## 2025-06-23 DIAGNOSIS — Z11.59 NEED FOR HEPATITIS C SCREENING TEST: ICD-10-CM

## 2025-06-23 DIAGNOSIS — F33.42 RECURRENT MAJOR DEPRESSIVE DISORDER, IN FULL REMISSION: ICD-10-CM

## 2025-06-23 DIAGNOSIS — F41.1 GENERALIZED ANXIETY DISORDER: ICD-10-CM

## 2025-06-23 DIAGNOSIS — E66.812 CLASS 2 SEVERE OBESITY DUE TO EXCESS CALORIES WITH SERIOUS COMORBIDITY AND BODY MASS INDEX (BMI) OF 38.0 TO 38.9 IN ADULT: ICD-10-CM

## 2025-06-23 DIAGNOSIS — K70.10 ALCOHOLIC HEPATITIS WITHOUT ASCITES: ICD-10-CM

## 2025-06-23 DIAGNOSIS — G47.33 OBSTRUCTIVE SLEEP APNEA SYNDROME: ICD-10-CM

## 2025-06-23 DIAGNOSIS — E78.2 MIXED HYPERLIPIDEMIA: Primary | ICD-10-CM

## 2025-06-23 PROCEDURE — 3078F DIAST BP <80 MM HG: CPT | Mod: CPTII,,, | Performed by: FAMILY MEDICINE

## 2025-06-23 PROCEDURE — 1160F RVW MEDS BY RX/DR IN RCRD: CPT | Mod: CPTII,,, | Performed by: FAMILY MEDICINE

## 2025-06-23 PROCEDURE — 1159F MED LIST DOCD IN RCRD: CPT | Mod: CPTII,,, | Performed by: FAMILY MEDICINE

## 2025-06-23 PROCEDURE — 3008F BODY MASS INDEX DOCD: CPT | Mod: CPTII,,, | Performed by: FAMILY MEDICINE

## 2025-06-23 PROCEDURE — 3044F HG A1C LEVEL LT 7.0%: CPT | Mod: CPTII,,, | Performed by: FAMILY MEDICINE

## 2025-06-23 PROCEDURE — 99214 OFFICE O/P EST MOD 30 MIN: CPT | Mod: ,,, | Performed by: FAMILY MEDICINE

## 2025-06-23 PROCEDURE — 3074F SYST BP LT 130 MM HG: CPT | Mod: CPTII,,, | Performed by: FAMILY MEDICINE

## 2025-06-23 NOTE — ASSESSMENT & PLAN NOTE
Therapeutic lifestyle changes with low-carbohydrate diet and regular exercise    Tirzepatide 7.5 mg weekly    If GI symptoms and side effects persists, decrease dosage to 5 mg weekly

## 2025-06-23 NOTE — ASSESSMENT & PLAN NOTE
Therapeutic lifestyle changes with low-carbohydrate, low-fat diet and regular exercise    Tirzepatide 7.5 mg weekly    If GI symptoms persist, decreased dosage to 5 mg weekly

## 2025-06-23 NOTE — PROGRESS NOTES
"SUBJECTIVE:  HPI    Chacho Galvan is a 35 y.o. male here for Follow-up (Follow up for labs) and Nausea.   History of Present Illness    CHIEF COMPLAINT:  Patient presents today for follow up of GI symptoms.    GASTROINTESTINAL:  He reports recurrent GI symptoms occurring 2-3 days after Tirzepatide injection. Symptoms include sulfur-like burps, vomiting episodes, and severe, uncontrollable diarrhea with generalized abdominal pain described as feeling like intense squeezing. Symptoms appear to be exacerbated by milk consumption and recently occurred after eating crawfish. Episodes require bed rest during symptomatic periods. His symptoms were less pronounced when on 5mg dose, experiencing only mild transient feelings of being "off" that would typically pass. He is currently on 7.5mg dose for the second month and suspects medication contribution to symptom severity.    WEIGHT MANAGEMENT:  He reports intentional weight loss from 302 lbs to approximately 270 lbs. He weighs himself weekly and notes recent fluctuation to 268 lbs, which he attributes to recent GI symptoms. He expresses intention to become more physically active to continue weight management.    ENT:  He reports persistent ear popping sensation and balance disturbances with unsteady gait and equilibrium issues. He experiences morning ear wetness and muffled hearing. He notes changes in sound perception and hearing clarity without pain. He believes symptoms may be related to sleeping position or potential neck issues.        Depression and anxiety have improved.      Millies allergies, medications, history, and problem list were updated as appropriate.    ROS:  Pertinent ROS as above, otherwise negative    OBJECTIVE:  Vital signs  Visit Vitals  /72 (BP Location: Right arm, Patient Position: Sitting)   Pulse 78   Temp 96.6 °F (35.9 °C) (Temporal)   Ht 5' 10" (1.778 m)   Wt 121.8 kg (268 lb 9.6 oz)   SpO2 98%   BMI 38.54 kg/m²          PHYSICAL " EXAM:  General:  Awake, alert, no acute distress   Eyes:  Pupils equal, round, reactive to light.  Conjunctiva normal bilaterally.  Ears:  Bilateral external auditory canals normal.  Bilateral tympanic membranes with serous effusions.  Cardiovascular: Regular rate and rhythm.  No murmurs.  Respiratory: Clear to auscultation bilaterally, normal effort  Abdomen: Soft, nontender, nondistended, no hepatosplenomegaly or masses  Extremities:  No peripheral edema, no cyanosis  Skin: No rashes    Chemistry:  Lab Results   Component Value Date     06/12/2025    K 4.1 06/12/2025    BUN 12 06/12/2025    CREATININE 1.08 06/12/2025    EGFRNORACEVR 91 06/12/2025    CALCIUM 9.4 06/12/2025    ALKPHOS 57 01/28/2021    AST 38 06/12/2025    ALT 72 (H) 06/12/2025        Lab Results   Component Value Date    HGBA1C 5.0 06/12/2025        Hematology:  Lab Results   Component Value Date    WBC 7.6 06/12/2025    HGB 16.8 06/12/2025    MCV 92 06/12/2025     06/12/2025       Lipid Panel:  Lab Results   Component Value Date    CHOL 142 06/12/2025    HDL 34 (L) 06/12/2025    TRIG 172 (H) 06/12/2025        ASSESSMENT/PLAN:  1. Mixed hyperlipidemia  Overview:  Lab Results   Component Value Date    CHOL 142 06/12/2025    HDL 34 (L) 06/12/2025    TRIG 172 (H) 06/12/2025            Assessment & Plan:  Lipid profile dramatically improved with therapeutic lifestyle changes in the past    Orders:  -     Lipid Panel; Future; Expected date: 12/23/2025    2. Impaired fasting glucose  Overview:  August 11, 2022    Lab Results   Component Value Date    HGBA1C 5.0 06/12/2025         Assessment & Plan:  Therapeutic lifestyle changes with low-carbohydrate diet and regular exercise    Tirzepatide 7.5 mg weekly    If GI symptoms and side effects persists, decrease dosage to 5 mg weekly    Orders:  -     Hemoglobin A1C; Future; Expected date: 12/23/2025    3. Alcoholic hepatitis without ascites  Overview:  Lab Results   Component Value Date    ALT 72  (H) 06/12/2025    AST 38 06/12/2025    ALKPHOS 57 01/28/2021    BILITOT 0.8 06/12/2025     Ultrasound abdomen August 16, 2022:  Mild fatty infiltration of the liver    Assessment & Plan:  Previously improved with alcohol moderation    Orders:  -     Comprehensive Metabolic Panel; Future; Expected date: 12/23/2025  -     CBC Auto Differential; Future; Expected date: 12/23/2025    4. Class 2 severe obesity due to excess calories with serious comorbidity and body mass index (BMI) of 38.0 to 38.9 in adult  Assessment & Plan:  Therapeutic lifestyle changes with low-carbohydrate, low-fat diet and regular exercise    Tirzepatide 7.5 mg weekly    If GI symptoms persist, decreased dosage to 5 mg weekly      5. Recurrent major depressive disorder, in full remission  Assessment & Plan:  Controlled with Viibryd 20 mg daily and augmentation with Wellbutrin  mg daily       6. Generalized anxiety disorder  Assessment & Plan:  Controlled with Viibryd 20 mg daily and augmentation with Wellbutrin  mg daily and propranolol      7. Obstructive sleep apnea syndrome  Overview:  Diagnosed January 2021    Assessment & Plan:  Compliance with CPAP reinforced    Tirzepatide      8. Need for hepatitis C screening test  -     Hepatitis C Antibody; Future; Expected date: 12/23/2025            Follow Up:  Follow up in about 6 months (around 12/23/2025) for chronic health conditions, Fasting labs.      This note was generated with the assistance of ambient listening technology. Verbal consent was obtained by the patient and accompanying visitor(s) for the recording of patient appointment to facilitate this note. I attest to having reviewed and edited the generated note for accuracy, though some syntax or spelling errors may persist. Please contact the author of this note for any clarification.

## 2025-07-17 DIAGNOSIS — F41.1 GENERALIZED ANXIETY DISORDER: ICD-10-CM

## 2025-07-17 DIAGNOSIS — F33.42 RECURRENT MAJOR DEPRESSIVE DISORDER, IN FULL REMISSION: ICD-10-CM

## 2025-07-17 RX ORDER — VILAZODONE HYDROCHLORIDE 20 MG/1
1 TABLET ORAL
Qty: 90 TABLET | Refills: 3 | Status: SHIPPED | OUTPATIENT
Start: 2025-07-17

## 2025-07-17 RX ORDER — PROPRANOLOL HYDROCHLORIDE 120 MG/1
120 CAPSULE, EXTENDED RELEASE ORAL
Qty: 90 CAPSULE | Refills: 3 | Status: SHIPPED | OUTPATIENT
Start: 2025-07-17

## 2025-09-03 DIAGNOSIS — F33.42 RECURRENT MAJOR DEPRESSIVE DISORDER, IN FULL REMISSION: ICD-10-CM

## 2025-09-03 RX ORDER — BUPROPION HYDROCHLORIDE 150 MG/1
150 TABLET ORAL DAILY
Qty: 90 TABLET | Refills: 3 | Status: SHIPPED | OUTPATIENT
Start: 2025-09-03